# Patient Record
Sex: MALE | Race: WHITE | NOT HISPANIC OR LATINO | Employment: FULL TIME | URBAN - METROPOLITAN AREA
[De-identification: names, ages, dates, MRNs, and addresses within clinical notes are randomized per-mention and may not be internally consistent; named-entity substitution may affect disease eponyms.]

---

## 2017-06-13 ENCOUNTER — OFFICE VISIT (OUTPATIENT)
Dept: FAMILY MEDICINE | Facility: CLINIC | Age: 32
End: 2017-06-13
Payer: COMMERCIAL

## 2017-06-13 VITALS
SYSTOLIC BLOOD PRESSURE: 114 MMHG | HEIGHT: 67 IN | RESPIRATION RATE: 16 BRPM | OXYGEN SATURATION: 98 % | HEART RATE: 56 BPM | DIASTOLIC BLOOD PRESSURE: 70 MMHG | TEMPERATURE: 99 F | BODY MASS INDEX: 24.26 KG/M2 | WEIGHT: 154.56 LBS

## 2017-06-13 DIAGNOSIS — J06.9 UPPER RESPIRATORY TRACT INFECTION, UNSPECIFIED TYPE: Primary | ICD-10-CM

## 2017-06-13 PROCEDURE — 99213 OFFICE O/P EST LOW 20 MIN: CPT | Mod: S$GLB,,, | Performed by: FAMILY MEDICINE

## 2017-06-13 PROCEDURE — 99999 PR PBB SHADOW E&M-EST. PATIENT-LVL III: CPT | Mod: PBBFAC,,, | Performed by: FAMILY MEDICINE

## 2017-06-13 NOTE — PATIENT INSTRUCTIONS
Viral Upper Respiratory Illness (Adult)  You have a viral upper respiratory illness (URI), which is another term for the common cold. This illness is contagious during the first few days. It is spread through the air by coughing and sneezing. It may also be spread by direct contact (touching the sick person and then touching your own eyes, nose, or mouth). Frequent handwashing will decrease risk of spread. Most viral illnesses go away within 7 to 10 days with rest and simple home remedies. Sometimes the illness may last for several weeks. Antibiotics will not kill a virus, and they are generally not prescribed for this condition.    Home care  · If symptoms are severe, rest at home for the first 2 to 3 days. When you resume activity, don't let yourself get too tired.  · Avoid being exposed to cigarette smoke (yours or others).  · You may use acetaminophen or ibuprofen to control pain and fever, unless another medicine was prescribed. (Note: If you have chronic liver or kidney disease, have ever had a stomach ulcer or gastrointestinal bleeding, or are taking blood-thinning medicines, talk with your healthcare provider before using these medicines.) Aspirin should never be given to anyone under 18 years of age who is ill with a viral infection or fever. It may cause severe liver or brain damage.  · Your appetite may be poor, so a light diet is fine. Avoid dehydration by drinking 6 to 8 glasses of fluids per day (water, soft drinks, juices, tea, or soup). Extra fluids will help loosen secretions in the nose and lungs.  · Over-the-counter cold medicines will not shorten the length of time youre sick, but they may be helpful for the following symptoms: cough, sore throat, and nasal and sinus congestion. (Note: Do not use decongestants if you have high blood pressure.)  Follow-up care  Follow up with your healthcare provider, or as advised.  When to seek medical advice  Call your healthcare provider right away if any  of these occur:  · Cough with lots of colored sputum (mucus)  · Severe headache; face, neck, or ear pain  · Difficulty swallowing due to throat pain  · Fever of 100.4°F (38°C)  Call 911, or get immediate medical care  Call emergency services right away if any of these occur:  · Chest pain, shortness of breath, wheezing, or difficulty breathing  · Coughing up blood  · Inability to swallow due to throat pain  Date Last Reviewed: 9/13/2015  © 6989-2699 [employer]. [address] [place] [id number]. All rights reserved. This information is not intended as a substitute for professional medical care. Always follow your healthcare professional's instructions.

## 2017-06-13 NOTE — PROGRESS NOTES
Subjective:       Patient ID: Beni Vizcaino is a 31 y.o. male.    Chief Complaint: Otalgia and Sore Throat    HPI 31 year old male here for 2 day history of bilateral ear pain and sore throat. Patient denies fevers/sinus pressure. He has tried cold flu without relief. He states symptoms are intermittent. He denies sick contacts.   Review of Systems   Constitutional: Negative for chills and fever.   HENT: Positive for ear pain and sore throat. Negative for postnasal drip and sinus pressure.    Respiratory: Negative for chest tightness and shortness of breath.    Cardiovascular: Negative for chest pain and leg swelling.   Gastrointestinal: Negative for abdominal pain, diarrhea, nausea and vomiting.       Objective:      Vitals:    06/13/17 0950   BP: 114/70   Pulse: (!) 56   Resp: 16   Temp: 98.9 °F (37.2 °C)     Physical Exam   Constitutional: He is oriented to person, place, and time. He appears well-developed and well-nourished. No distress.   HENT:   Right Ear: External ear normal.   Left Ear: External ear normal.   Mouth/Throat: Oropharynx is clear and moist. No oropharyngeal exudate.   Eyes: EOM are normal. Right eye exhibits no discharge. Left eye exhibits no discharge.   Neck: Normal range of motion.   Cardiovascular: Normal rate and regular rhythm.    Pulmonary/Chest: Effort normal. He has no wheezes.   Lymphadenopathy:     He has no cervical adenopathy.   Neurological: He is alert and oriented to person, place, and time.   Skin: He is not diaphoretic.   Psychiatric: He has a normal mood and affect. His behavior is normal. Judgment and thought content normal.   Vitals reviewed.      Assessment:       1. Upper respiratory tract infection, unspecified type        Plan:       Upper respiratory tract infection, unspecified type    likely viral in origin. Patient advised on symptomatic treatment including salt water gargles, chloraseptic spray prn, and alternating between ibuprofen/tylenol for pain control. He was  advised to call clinic if symptoms worsen and he develops fever.   Return if symptoms worsen or fail to improve.

## 2017-07-13 ENCOUNTER — OFFICE VISIT (OUTPATIENT)
Dept: FAMILY MEDICINE | Facility: CLINIC | Age: 32
End: 2017-07-13
Payer: COMMERCIAL

## 2017-07-13 VITALS
SYSTOLIC BLOOD PRESSURE: 98 MMHG | OXYGEN SATURATION: 97 % | HEART RATE: 56 BPM | BODY MASS INDEX: 23.74 KG/M2 | WEIGHT: 151.25 LBS | DIASTOLIC BLOOD PRESSURE: 58 MMHG | HEIGHT: 67 IN | RESPIRATION RATE: 16 BRPM | TEMPERATURE: 99 F

## 2017-07-13 DIAGNOSIS — R11.2 NAUSEA AND VOMITING, INTRACTABILITY OF VOMITING NOT SPECIFIED, UNSPECIFIED VOMITING TYPE: ICD-10-CM

## 2017-07-13 DIAGNOSIS — J31.0 RHINITIS, UNSPECIFIED CHRONICITY, UNSPECIFIED TYPE: Primary | ICD-10-CM

## 2017-07-13 DIAGNOSIS — H65.93 MIDDLE EAR EFFUSION, BILATERAL: ICD-10-CM

## 2017-07-13 PROCEDURE — 99213 OFFICE O/P EST LOW 20 MIN: CPT | Mod: S$GLB,,, | Performed by: NURSE PRACTITIONER

## 2017-07-13 PROCEDURE — 99999 PR PBB SHADOW E&M-EST. PATIENT-LVL IV: CPT | Mod: PBBFAC,,, | Performed by: NURSE PRACTITIONER

## 2017-07-13 RX ORDER — IPRATROPIUM BROMIDE 42 UG/1
2 SPRAY, METERED NASAL 2 TIMES DAILY
Qty: 15 ML | Refills: 0 | Status: SHIPPED | OUTPATIENT
Start: 2017-07-13 | End: 2017-07-20

## 2017-07-13 RX ORDER — ONDANSETRON HYDROCHLORIDE 8 MG/1
8 TABLET, FILM COATED ORAL EVERY 8 HOURS PRN
Qty: 15 TABLET | Refills: 0 | Status: SHIPPED | OUTPATIENT
Start: 2017-07-13 | End: 2017-07-18

## 2017-07-13 NOTE — PATIENT INSTRUCTIONS
1. Rest + adequate fluid intake.  2. OTC cetirizine (Zyrtec) for fluid in ears.   3. Follow-up if symptoms worsen or fail to improve.

## 2017-07-13 NOTE — PROGRESS NOTES
"Subjective:       Patient ID: Beni Vizcaino is a 31 y.o. male.    Chief Complaint: Other ("felt lilke flu")    HPI   Patient reports to clinic with c/o nausea, vomiting, sneezing, runny nose, and fatigue. Onset: today. Patient reports he had two episodes of vomiting today after eating (baked chicken that was prepped on Sunday night). 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 He states all his symptoms have resolved since going home today around 2:30 PM. He reports one of his co-workers left work sick today as well. Patient is currently feeling better.    Review of Systems   Constitutional: Positive for fatigue. Negative for fever.   HENT: Positive for rhinorrhea. Negative for congestion, postnasal drip, sinus pressure, sneezing and sore throat.    Eyes: Negative.    Respiratory: Negative for cough.    Cardiovascular: Negative.    Gastrointestinal: Negative for diarrhea, nausea and vomiting.   Endocrine: Negative.    Neurological: Negative for dizziness, weakness, light-headedness and headaches.       Objective:      Physical Exam   Constitutional: He is oriented to person, place, and time. He appears well-developed and well-nourished. No distress.   HENT:   Head: Normocephalic and atraumatic.   Eyes: EOM are normal. Pupils are equal, round, and reactive to light.   Neck: Normal range of motion. Neck supple.   Cardiovascular: Regular rhythm and normal heart sounds.  Bradycardia present.    Pulmonary/Chest: Effort normal and breath sounds normal. No respiratory distress.   Abdominal: Soft. Bowel sounds are normal. There is tenderness in the right lower quadrant.   Musculoskeletal: Normal range of motion. He exhibits no edema.   Lymphadenopathy:     He has no cervical adenopathy.   Neurological: He is alert and oriented to person, place, and time. Coordination normal.   Skin: Skin is warm and dry.   Psychiatric: He has a normal mood and affect. His behavior is normal. Judgment and " thought content normal.   Nursing note and vitals reviewed.      Assessment:       1. Rhinitis, unspecified chronicity, unspecified type    2. Nausea and vomiting, intractability of vomiting not specified, unspecified vomiting type    3. Middle ear effusion, bilateral        Plan:         Diagnoses and all orders for this visit:    Rhinitis, unspecified chronicity, unspecified type  -     ipratropium (ATROVENT) 0.06 % nasal spray; 2 sprays by Nasal route 2 (two) times daily.    Nausea and vomiting, intractability of vomiting not specified, unspecified vomiting type  -     ondansetron (ZOFRAN) 8 MG tablet; Take 1 tablet (8 mg total) by mouth every 8 (eight) hours as needed for Nausea.    Middle ear effusion, bilateral    Other orders  1. Rest + adequate fluid intake.  2. OTC cetirizine (Zyrtec) for fluid in ears.       Follow-up if symptoms worsen or fail to improve.    Trice Haney NP

## 2017-08-04 ENCOUNTER — OFFICE VISIT (OUTPATIENT)
Dept: FAMILY MEDICINE | Facility: CLINIC | Age: 32
End: 2017-08-04
Payer: COMMERCIAL

## 2017-08-04 VITALS
TEMPERATURE: 99 F | HEART RATE: 70 BPM | DIASTOLIC BLOOD PRESSURE: 60 MMHG | OXYGEN SATURATION: 98 % | RESPIRATION RATE: 18 BRPM | HEIGHT: 67 IN | SYSTOLIC BLOOD PRESSURE: 110 MMHG | BODY MASS INDEX: 24.36 KG/M2 | WEIGHT: 155.19 LBS

## 2017-08-04 DIAGNOSIS — R10.13 DYSPEPSIA: Primary | ICD-10-CM

## 2017-08-04 PROCEDURE — 3008F BODY MASS INDEX DOCD: CPT | Mod: S$GLB,,, | Performed by: FAMILY MEDICINE

## 2017-08-04 PROCEDURE — 99999 PR PBB SHADOW E&M-EST. PATIENT-LVL III: CPT | Mod: PBBFAC,,, | Performed by: FAMILY MEDICINE

## 2017-08-04 PROCEDURE — 99214 OFFICE O/P EST MOD 30 MIN: CPT | Mod: S$GLB,,, | Performed by: FAMILY MEDICINE

## 2017-08-04 NOTE — PROGRESS NOTES
Subjective:       Patient ID: Beni Vizcaino is a 31 y.o. male.    Chief Complaint: Abdominal Pain (a few weeks off and on); Nausea; and Emesis (off and on )    HPI 31 year old male here for two week history of intermittent abdominal pain, nausea and vomiting. Patient has had a recent sinus infection but did not take antibiotics. Patient states the pain has happened three times in the last 2 weeks after eating chicken/mashed potatoes. Patient denies fevers/diarrhea/blood in stool. Patient denies urinary changes. He states he eats the same meal every weeknight as he is on a meal plan at work and the three occurrences, happened when he was eating outside in a rush.   Patient takes ibuprofen only if he has sinus pressure, on average once a month.   Patient drinks alcohol twice a month and on average, 4-5 hard liquor drinks in one night.     Review of Systems   Constitutional: Negative for chills and fever.   Respiratory: Negative for chest tightness and shortness of breath.    Cardiovascular: Negative for chest pain and leg swelling.   Gastrointestinal: Negative for abdominal pain, diarrhea, nausea and vomiting.   Genitourinary: Negative for dysuria and hematuria.   Psychiatric/Behavioral: Negative for agitation and behavioral problems.       Objective:      Vitals:    08/04/17 1407   BP: 110/60   Pulse: 70   Resp: 18   Temp: 99 °F (37.2 °C)     Physical Exam   Constitutional: He is oriented to person, place, and time. He appears well-developed and well-nourished. No distress.   HENT:   Mouth/Throat: Oropharynx is clear and moist. No oropharyngeal exudate.   Cardiovascular: Normal rate and regular rhythm.    Pulmonary/Chest: Effort normal. He has no wheezes.   Abdominal: Soft. There is no tenderness. There is no guarding.   Neurological: He is alert and oriented to person, place, and time.   Skin: He is not diaphoretic.   Psychiatric: He has a normal mood and affect. His behavior is normal. Judgment and thought content  normal.   Vitals reviewed.      Assessment:       1. Dyspepsia        Plan:         1. Abdominal pain, nausea, intermittent: will start zantac. Patient thinks there is a relation with overheating and his symptoms so I have advised on eating in a cooler environment and increasing water intake.   Patient to call in 2 weeks with update. If symptoms persist will get labs/imaging   Dyspepsia    Other orders  -     ranitidine (ZANTAC) 150 MG tablet; Take 1 tablet (150 mg total) by mouth 2 (two) times daily.  Dispense: 60 tablet; Refill: 0      Return in about 2 weeks (around 8/18/2017).

## 2017-10-11 DIAGNOSIS — M54.16 LUMBAR RADICULITIS: Primary | ICD-10-CM

## 2018-04-24 ENCOUNTER — TELEPHONE (OUTPATIENT)
Dept: ORTHOPEDICS | Facility: CLINIC | Age: 33
End: 2018-04-24

## 2018-04-24 NOTE — TELEPHONE ENCOUNTER
Spoke with patient's mother to schedule appointment. She was offered May 8th and offered to be placed on wait list. States that she will find another doctor. Verbalized understanding.    ----- Message from Bri Swenson sent at 4/24/2018  8:17 AM CDT -----  Contact: Alicia (mother)/ [phone]  Patients mother called in to ask if patient can be seen today. He is having right calf pain and he would be a new patient.    Please call and advise.